# Patient Record
Sex: MALE | Race: BLACK OR AFRICAN AMERICAN | NOT HISPANIC OR LATINO | Employment: UNEMPLOYED | URBAN - METROPOLITAN AREA
[De-identification: names, ages, dates, MRNs, and addresses within clinical notes are randomized per-mention and may not be internally consistent; named-entity substitution may affect disease eponyms.]

---

## 2018-11-29 ENCOUNTER — HOSPITAL ENCOUNTER (EMERGENCY)
Facility: CLINIC | Age: 23
Discharge: HOME | End: 2018-11-29
Attending: EMERGENCY MEDICINE | Admitting: EMERGENCY MEDICINE
Payer: COMMERCIAL

## 2018-11-29 VITALS
DIASTOLIC BLOOD PRESSURE: 88 MMHG | SYSTOLIC BLOOD PRESSURE: 136 MMHG | TEMPERATURE: 98.5 F | BODY MASS INDEX: 20.73 KG/M2 | HEIGHT: 69 IN | WEIGHT: 140 LBS | HEART RATE: 86 BPM | RESPIRATION RATE: 16 BRPM | OXYGEN SATURATION: 99 %

## 2018-11-29 DIAGNOSIS — T78.40XA ALLERGIC REACTION, INITIAL ENCOUNTER: ICD-10-CM

## 2018-11-29 PROCEDURE — 99282 EMERGENCY DEPT VISIT SF MDM: CPT

## 2018-11-29 ASSESSMENT — ENCOUNTER SYMPTOMS
NAUSEA: 1
FEVER: 0
DYSURIA: 0
TROUBLE SWALLOWING: 0
ABDOMINAL PAIN: 1
VOMITING: 1
SHORTNESS OF BREATH: 0
DIARRHEA: 1

## 2018-11-29 NOTE — ED PROVIDER NOTES
History     Chief Complaint:  Abdominal Pain, Vomiting, and Rash     HPI   Davis Herrera is a 23 year old male who presents for evaluation of abdominal pain, vomiting, and a rash. This morning around 0600, the patient awoke with abdominal pain, nausea, and itchy skin. He vomited once and then went back to sleep. When he awoke later in the morning, he noticed that his skin appeared abnormally red. Since then, his symptoms have been improving over time. However, due to concern about these recent symptoms he came into the ED for evaluation. Currently in the ED, the patient reports that he is feeling well and his rash has resolved. Notably, the patient ate [employer] pizza with green peppers last night, and he does not eat green peppers often. He has not had any exposure to other new foods or new medications. He has no known food allergies. Otherwise, the patient reports that he has been having intermittent diarrhea for the last several days, but he denies any recent fever, difficulty swallowing, shortness of breath, or dysuria.     Allergies:  NKDA      Medications:    The patient is not currently taking any prescribed medications.      Past Medical History:    Kidney laceration     Past Surgical History:    History reviewed. No pertinent past surgical history.     Family History:    History reviewed. No pertinent family history.     Social History:  Tobacco use:    Current every day smoker   Alcohol use:    Positive   Marital status:    Single   The patient presents to the ED alone.      Review of Systems   Constitutional: Negative for fever.   HENT: Negative for trouble swallowing.    Respiratory: Negative for shortness of breath.    Gastrointestinal: Positive for abdominal pain, diarrhea, nausea and vomiting.   Genitourinary: Negative for dysuria.   Skin: Positive for rash.   All other systems reviewed and are negative.    Physical Exam   First Vitals:  BP: 140/60  Heart Rate: 63  Temp: 98.5  F (36.9  C)  Resp:  "18  Height: 175.3 cm (5' 9\")  Weight: 63.5 kg (140 lb)  SpO2: 99 %      Physical Exam  Constitutional: Black male supine.   HENT: No signs of trauma. Oropharynx is clear.   Eyes: EOM are normal. Pupils are equal, round, and reactive to light.   Neck: Normal range of motion. No JVD present. No cervical adenopathy.  Cardiovascular: Regular rhythm.  Exam reveals no gallop and no friction rub.    No murmur heard.  Pulmonary/Chest: Bilateral breath sounds normal. No wheezes, rhonchi or rales.  Abdominal: Soft. No tenderness. No rebound or guarding. Active bowel sounds.   Musculoskeletal: No edema. No tenderness.   Lymphadenopathy: No lymphadenopathy.   Neurological: Alert and oriented to person, place, and time. Normal strength. Coordination normal.   Skin: Skin is warm and dry. No rash noted. No erythema.     Emergency Department Course     Emergency Department Course:  Nursing notes and vitals reviewed.  1510: I performed an exam of the patient as documented above.     Findings and plan explained to the Patient. Patient discharged home with instructions regarding supportive care, medications, and reasons to return. The importance of close follow-up was reviewed.     Impression & Plan      Medical Decision Making:  Davis Herrera is a 23 year old male who presents with abdominal pain. He actually states that he woke up around six in the morning with abdominal pain and itching that got better, and when he woke up again around noon he states that his whole body was red and it was like he had giant welts. In the 2-3 hours since then, his symptoms seem to have resolved. He has no pain, no itching, no breathing or swallowing problems. He did eat a [employer] pizza last night with peppers, which is no something that he usually eats. He has no new medications or previous allergies. His exam now is unremarkable other than that his bowel sounds are increased. His abdomen is completely non-tender. His symptoms seem much more likely " related to some type of food allergy. I recommended Benadryl over the counter for recurrent symptoms and not to drive while using this and also have referred him to primary care, Dr. Bueno, if he would like to get allergy testing to find out what has caused this problem. He has had no breathing or swallowing problems to require adrenaline.     Impression:     ICD-10-CM   1. Allergic reaction, initial encounter T78.40XA       Plan:   As noted.       I, Parag Camargo, am serving as a scribe at 3:10 PM on 11/29/2018 to document services personally performed by Dr. Barroso, based on my observations and the provider's statements to me.     EMERGENCY DEPARTMENT       Levi Barroso MD  11/29/18 7575

## 2018-11-29 NOTE — ED AVS SNAPSHOT
Emergency Department    [address] [place] [address]    Phone:  [phone]    Fax:  [phone]                                       Davis Herrera   MRN: [id number]    Department:   Emergency Department   Date of Visit:  11/29/2018           After Visit Summary Signature Page     I have received my discharge instructions, and my questions have been answered. I have discussed any challenges I see with this plan with the nurse or doctor.    ..........................................................................................................................................  Patient/Patient Representative Signature      ..........................................................................................................................................  Patient Representative Print Name and Relationship to Patient    ..................................................               ................................................  Date                                   Time    ..........................................................................................................................................  Reviewed by Signature/Title    ...................................................              ..............................................  Date                                               Time          22EPIC Rev 08/18

## 2018-11-29 NOTE — ED AVS SNAPSHOT
Emergency Department    [address] [facility] [address]    Phone:  [phone]    Fax:  [phone]                                       Davis Herrera   MRN: [id number]    Department:   Emergency Department   Date of Visit:  11/29/2018           Patient Information     Date Of Birth          1995        Your diagnoses for this visit were:     Allergic reaction, initial encounter        You were seen by Levi Barroso MD.      Follow-up Information     Schedule an appointment as soon as possible for a visit with Aquiles Bueno MD.    Specialty:  Internal Medicine    Why:  recheck ed, As needed;  use otc benadryl as needed    Contact information:    [address] [id number] [phone]        Discharge References/Attachments     ALLERGIC REACTIONS, GENERAL (ENGLISH)      24 Hour Appointment Hotline       To make an appointment at any [facility], call [phone] ([phone]). If you don't have a family doctor or clinic, we will help you find one. [facility] are conveniently located to serve the needs of you and your family.             Review of your medicines      Notice     You have not been prescribed any medications.            Orders Needing Specimen Collection     None      Pending Results     No orders found from 11/27/2018 to 11/30/2018.            Pending Culture Results     No orders found from 11/27/2018 to 11/30/2018.            Pending Results Instructions     If you had any lab results that were not finalized at the time of your Discharge, you can call the ED Lab Result RN at [phone]. You will be contacted by this team for any positive Lab results or changes in treatment. The nurses are available 7 days a week from 10A to 6:30P.  You can leave a message 24 hours per day and they will return your call.        Test Results From Your Hospital Stay               Clinical Quality Measure: Blood Pressure Screening     Your  "blood pressure was checked while you were in the emergency department today. The last reading we obtained was  BP: 140/60 . Please read the guidelines below about what these numbers mean and what you should do about them.  If your systolic blood pressure (the top number) is less than 120 and your diastolic blood pressure (the bottom number) is less than 80, then your blood pressure is normal. There is nothing more that you need to do about it.  If your systolic blood pressure (the top number) is 120-139 or your diastolic blood pressure (the bottom number) is 80-89, your blood pressure may be higher than it should be. You should have your blood pressure rechecked within a year by a primary care provider.  If your systolic blood pressure (the top number) is 140 or greater or your diastolic blood pressure (the bottom number) is 90 or greater, you may have high blood pressure. High blood pressure is treatable, but if left untreated over time it can put you at risk for heart attack, stroke, or kidney failure. You should have your blood pressure rechecked by a primary care provider within the next 4 weeks.  If your provider in the emergency department today gave you specific instructions to follow-up with your doctor or provider even sooner than that, you should follow that instruction and not wait for up to 4 weeks for your follow-up visit.        Thank you for choosing [facility]       Thank you for choosing [facility] for your care. Our goal is always to provide you with excellent care. Hearing back from our patients is one way we can continue to improve our services. Please take a few minutes to complete the written survey that you may receive in the mail after you visit with us. Thank you!        [facility] Information     [facility] lets you send messages to your doctor, view your test results, renew your prescriptions, schedule appointments and more. To sign up, go to [email or web address] . Click on \"Log in\" on the left " "side of the screen, which will take you to the Welcome page. Then click on \"Sign up Now\" on the right side of the page.     You will be asked to enter the access code listed below, as well as some personal information. Please follow the directions to create your username and password.     Your access code is: [email or web address]  Expires: 2019  3:35 PM     Your access code will  in 90 days. If you need help or a new code, please call your [facility] clinic or [phone].        Care EveryWhere ID     This is your Care EveryWhere ID. This could be used by other organizations to access your [facility] medical records  [id number]        Equal Access to Services     HENRIQUE BUTTS : [place], emely huerta, bereket moses, renard guido. So [facility] [phone].    ATENCIÓN: Si habla español, tiene a snyder disposición servicios gratuitos de asistencia lingüística. Llame al [phone].    We comply with applicable federal civil rights laws and [place] laws. We do not discriminate on the basis of race, color, national origin, age, disability, sex, sexual orientation, or gender identity.            After Visit Summary       This is your record. Keep this with you and show to your community pharmacist(s) and doctor(s) at your next visit.                  "

## 2022-10-18 ENCOUNTER — HOSPITAL ENCOUNTER (EMERGENCY)
Facility: CLINIC | Age: 27
Discharge: HOME | End: 2022-10-18
Attending: EMERGENCY MEDICINE | Admitting: EMERGENCY MEDICINE
Payer: COMMERCIAL

## 2022-10-18 ENCOUNTER — APPOINTMENT (OUTPATIENT)
Dept: GENERAL RADIOLOGY | Facility: CLINIC | Age: 27
End: 2022-10-18
Attending: EMERGENCY MEDICINE
Payer: COMMERCIAL

## 2022-10-18 VITALS
DIASTOLIC BLOOD PRESSURE: 93 MMHG | RESPIRATION RATE: 18 BRPM | OXYGEN SATURATION: 98 % | BODY MASS INDEX: 21.47 KG/M2 | HEART RATE: 78 BPM | TEMPERATURE: 97.5 F | WEIGHT: 150 LBS | HEIGHT: 70 IN | SYSTOLIC BLOOD PRESSURE: 157 MMHG

## 2022-10-18 DIAGNOSIS — R07.9 CHEST PAIN, UNSPECIFIED TYPE: ICD-10-CM

## 2022-10-18 LAB
ALBUMIN SERPL-MCNC: 3.9 G/DL (ref 3.4–5)
ALP SERPL-CCNC: 77 U/L (ref 40–150)
ALT SERPL W P-5'-P-CCNC: 32 U/L (ref 0–70)
ANION GAP SERPL CALCULATED.3IONS-SCNC: 3 MMOL/L (ref 3–14)
AST SERPL W P-5'-P-CCNC: 21 U/L (ref 0–45)
ATRIAL RATE - MUSE: 87 BPM
BASOPHILS # BLD AUTO: 0 10E3/UL (ref 0–0.2)
BASOPHILS NFR BLD AUTO: 1 %
BILIRUB SERPL-MCNC: 0.4 MG/DL (ref 0.2–1.3)
BUN SERPL-MCNC: 13 MG/DL (ref 7–30)
CALCIUM SERPL-MCNC: 9.5 MG/DL (ref 8.5–10.1)
CHLORIDE BLD-SCNC: 104 MMOL/L (ref 94–109)
CO2 SERPL-SCNC: 31 MMOL/L (ref 20–32)
CREAT SERPL-MCNC: 0.75 MG/DL (ref 0.66–1.25)
CRP SERPL-MCNC: <2.9 MG/L (ref 0–8)
DIASTOLIC BLOOD PRESSURE - MUSE: NORMAL MMHG
EOSINOPHIL # BLD AUTO: 0.1 10E3/UL (ref 0–0.7)
EOSINOPHIL NFR BLD AUTO: 2 %
ERYTHROCYTE [DISTWIDTH] IN BLOOD BY AUTOMATED COUNT: 14.8 % (ref 10–15)
ERYTHROCYTE [SEDIMENTATION RATE] IN BLOOD BY WESTERGREN METHOD: 7 MM/HR (ref 0–15)
GFR SERPL CREATININE-BSD FRML MDRD: >90 ML/MIN/1.73M2
GLUCOSE BLD-MCNC: 95 MG/DL (ref 70–99)
HCT VFR BLD AUTO: 40.9 % (ref 40–53)
HGB BLD-MCNC: 13.2 G/DL (ref 13.3–17.7)
HOLD SPECIMEN: NORMAL
HOLD SPECIMEN: NORMAL
IMM GRANULOCYTES # BLD: 0 10E3/UL
IMM GRANULOCYTES NFR BLD: 0 %
INTERPRETATION ECG - MUSE: NORMAL
LYMPHOCYTES # BLD AUTO: 1.8 10E3/UL (ref 0.8–5.3)
LYMPHOCYTES NFR BLD AUTO: 37 %
MCH RBC QN AUTO: 24.4 PG (ref 26.5–33)
MCHC RBC AUTO-ENTMCNC: 32.3 G/DL (ref 31.5–36.5)
MCV RBC AUTO: 76 FL (ref 78–100)
MONOCYTES # BLD AUTO: 0.7 10E3/UL (ref 0–1.3)
MONOCYTES NFR BLD AUTO: 15 %
NEUTROPHILS # BLD AUTO: 2.2 10E3/UL (ref 1.6–8.3)
NEUTROPHILS NFR BLD AUTO: 45 %
NRBC # BLD AUTO: 0 10E3/UL
NRBC BLD AUTO-RTO: 0 /100
P AXIS - MUSE: 81 DEGREES
PLATELET # BLD AUTO: 292 10E3/UL (ref 150–450)
POTASSIUM BLD-SCNC: 3.8 MMOL/L (ref 3.4–5.3)
PR INTERVAL - MUSE: 164 MS
PROT SERPL-MCNC: 7.9 G/DL (ref 6.8–8.8)
QRS DURATION - MUSE: 88 MS
QT - MUSE: 354 MS
QTC - MUSE: 425 MS
R AXIS - MUSE: 91 DEGREES
RBC # BLD AUTO: 5.41 10E6/UL (ref 4.4–5.9)
SODIUM SERPL-SCNC: 138 MMOL/L (ref 133–144)
SYSTOLIC BLOOD PRESSURE - MUSE: NORMAL MMHG
T AXIS - MUSE: 77 DEGREES
TROPONIN I SERPL HS-MCNC: <3 NG/L
VENTRICULAR RATE- MUSE: 87 BPM
WBC # BLD AUTO: 4.8 10E3/UL (ref 4–11)

## 2022-10-18 PROCEDURE — 71046 X-RAY EXAM CHEST 2 VIEWS: CPT

## 2022-10-18 PROCEDURE — 85025 COMPLETE CBC W/AUTO DIFF WBC: CPT | Performed by: EMERGENCY MEDICINE

## 2022-10-18 PROCEDURE — 80053 COMPREHEN METABOLIC PANEL: CPT | Performed by: EMERGENCY MEDICINE

## 2022-10-18 PROCEDURE — 84484 ASSAY OF TROPONIN QUANT: CPT | Performed by: EMERGENCY MEDICINE

## 2022-10-18 PROCEDURE — 96374 THER/PROPH/DIAG INJ IV PUSH: CPT

## 2022-10-18 PROCEDURE — 86140 C-REACTIVE PROTEIN: CPT | Performed by: EMERGENCY MEDICINE

## 2022-10-18 PROCEDURE — 99285 EMERGENCY DEPT VISIT HI MDM: CPT | Mod: 25

## 2022-10-18 PROCEDURE — 82040 ASSAY OF SERUM ALBUMIN: CPT | Performed by: EMERGENCY MEDICINE

## 2022-10-18 PROCEDURE — 36415 COLL VENOUS BLD VENIPUNCTURE: CPT | Performed by: EMERGENCY MEDICINE

## 2022-10-18 PROCEDURE — 250N000011 HC RX IP 250 OP 636: Performed by: EMERGENCY MEDICINE

## 2022-10-18 PROCEDURE — 85652 RBC SED RATE AUTOMATED: CPT | Performed by: EMERGENCY MEDICINE

## 2022-10-18 PROCEDURE — 93005 ELECTROCARDIOGRAM TRACING: CPT

## 2022-10-18 RX ORDER — KETOROLAC TROMETHAMINE 15 MG/ML
15 INJECTION, SOLUTION INTRAMUSCULAR; INTRAVENOUS ONCE
Status: COMPLETED | OUTPATIENT
Start: 2022-10-18 | End: 2022-10-18

## 2022-10-18 RX ORDER — IBUPROFEN 600 MG/1
600 TABLET, FILM COATED ORAL EVERY 6 HOURS PRN
Qty: 21 TABLET | Refills: 0 | Status: SHIPPED | OUTPATIENT
Start: 2022-10-18

## 2022-10-18 RX ADMIN — KETOROLAC TROMETHAMINE 15 MG: 15 INJECTION, SOLUTION INTRAMUSCULAR; INTRAVENOUS at 11:50

## 2022-10-18 ASSESSMENT — ENCOUNTER SYMPTOMS
SHORTNESS OF BREATH: 0
HEADACHES: 0
ABDOMINAL PAIN: 0
FEVER: 0
PALPITATIONS: 0
VOMITING: 0

## 2022-10-18 NOTE — ED PROVIDER NOTES
"  History     Chief Complaint:  Chest Pain       HPI   Davis Herrera is a 27 year old male who presents with concern of left-sided chest pain.  Is been present for 2 to 3 days.  He has not taken anything for the pain.  No cough or COVID concern.  No recent fevers.  No URI symptoms.  1 month ago he had a sore throat, but that resolved on its own.  No history of diabetes, high blood pressure or high cholesterol.  Patient does smoke.  No history of albuterol use.  No nausea, vomiting or diarrhea.  No abdominal pain.  No history of DVT or pulmonary embolism.  No recent long distance travel or surgery.  No hemoptysis.    ROS:  Review of Systems   Constitutional: Negative for fever.   Respiratory: Negative for shortness of breath.    Cardiovascular: Positive for chest pain. Negative for palpitations and leg swelling.   Gastrointestinal: Negative for abdominal pain and vomiting.   Skin: Negative for rash.   Neurological: Negative for headaches.   All other systems reviewed and are negative.    Allergies:  No Known Allergies     Medications:    Does not take any daily medications    Past Medical History:    Migraines  Right kidney laceration  Ankle sprain    Past Surgical History:    None    Family History:    No family history of asthma or heart    Social History:   reports that he has been smoking. He does not have any smokeless tobacco history on file. He reports current alcohol use. He reports current drug use. Drug: Marijuana.  PCP: No Ref-Primary, Physician     Physical Exam     Patient Vitals for the past 24 hrs:   BP Temp Temp src Pulse Resp SpO2 Height Weight   10/18/22 1136 (!) 157/93 97.5  F (36.4  C) Temporal 78 18 98 % 1.778 m (5' 10\") 68 kg (150 lb)        Physical Exam  Physical Exam   General:  Sitting on bed by self.   HENT:  No obvious trauma to head  Right Ear:  External ear normal.   Left Ear:  External ear normal.   Nose:  Nose normal.   Eyes:  Conjunctivae and EOM are normal. Pupils are equal, round, " and reactive.   Neck: Normal range of motion. Neck supple. No tracheal deviation present.   CV:  Normal heart sounds. No murmur heard.  Pulm/Chest: Effort normal and breath sounds normal.   Abd: Soft. No distension. There is no tenderness. There is no rigidity, no rebound and no guarding.   M/S: Normal range of motion. no calf pain or swelling b/l  Neuro: Alert. GCS 15.  Skin: Skin is warm and dry. No rash noted. Not diaphoretic.   Psych: Normal mood and affect. Behavior is normal.     Emergency Department Course   ECG:  ECG results from 10/18/22   EKG 12 lead     Value    Systolic Blood Pressure     Diastolic Blood Pressure     Ventricular Rate 87    Atrial Rate 87    NE Interval 164    QRS Duration 88        QTc 425    P Axis 81    R AXIS 91    T Axis 77    Interpretation ECG      Sinus rhythm  Right atrial enlargement  Rightward axis  Pulmonary disease pattern  ST elevation, consider early repolarization, pericarditis, or injury  Abnormal ECG  No previous ECGs available         Imaging:  Chest XR,  PA & LAT   Preliminary Result   IMPRESSION: No acute cardiopulmonary disease.         Report per radiology    Laboratory:  Labs Ordered and Resulted from Time of ED Arrival to Time of ED Departure   CBC WITH PLATELETS AND DIFFERENTIAL - Abnormal       Result Value    WBC Count 4.8      RBC Count 5.41      Hemoglobin 13.2 (*)     Hematocrit 40.9      MCV 76 (*)     MCH 24.4 (*)     MCHC 32.3      RDW 14.8      Platelet Count 292      % Neutrophils 45      % Lymphocytes 37      % Monocytes 15      % Eosinophils 2      % Basophils 1      % Immature Granulocytes 0      NRBCs per 100 WBC 0      Absolute Neutrophils 2.2      Absolute Lymphocytes 1.8      Absolute Monocytes 0.7      Absolute Eosinophils 0.1      Absolute Basophils 0.0      Absolute Immature Granulocytes 0.0      Absolute NRBCs 0.0     COMPREHENSIVE METABOLIC PANEL - Normal    Sodium 138      Potassium 3.8      Chloride 104      Carbon Dioxide (CO2) 31       Anion Gap 3      Urea Nitrogen 13      Creatinine 0.75      Calcium 9.5      Glucose 95      Alkaline Phosphatase 77      AST 21      ALT 32      Protein Total 7.9      Albumin 3.9      Bilirubin Total 0.4      GFR Estimate >90     TROPONIN I - Normal    Troponin I High Sensitivity <3     CRP INFLAMMATION - Normal    CRP Inflammation <2.9     ERYTHROCYTE SEDIMENTATION RATE AUTO - Normal    Erythrocyte Sedimentation Rate 7        Emergency Department Course:  Reviewed:  I reviewed nursing notes, vitals, care everywhere and past medical history    Assessments:  1150 I obtained history and examined the patient as noted above.   1336 I rechecked the patient and explained findings.     Interventions:  Medications   ketorolac (TORADOL) injection 15 mg (15 mg Intravenous Given 10/18/22 1150)      Disposition:  The patient was discharged to home.     Impression & Plan    Medical Decision Making:  Davis Herrera is a very pleasant 27 year old year old patient who presents to the emergency department with concern of left-sided chest pain for the past few days.  It is more noticeable when he moves and presses on the chest.  Screening EKG shows likely J-point elevation but I did consider pericarditis.  Labs are unremarkable.  Troponin is less than 3 ruling out non-STEMI/ACS/myocarditis.  Inflammatory markers are negative so pericarditis is less likely.  I recommended continue to take ibuprofen for musculoskeletal pain or in case this does represent a mild pericarditis it would be beneficial as weell.  Discussed reasons to return.  He he is not hypoxic or short of breath and has a low Wells score and is PERC negative. The chest xray was reviewed and shows no evidence of pneumothorax, infiltrate to suggest pneumonia, widened mediastinum to suggest aortic dissection, obvious rib fracture or free air under the diaphragm to suggest perforated viscous ulcer.  Recommended close follow-up with PCP.    The treatment plan was  discussed with the patient and they expressed understanding of this plan and consented to the plan.  In addition, the patient will return to the emergency department if their symptoms persist, worsen, if new symptoms arise or if there is any concern as other pathology may be present that is not evident at this time. They also understand the importance of close follow up in the clinic and if unable to do so will return to the emergency department for a reevaluation. All questions were answered.    Diagnosis:    ICD-10-CM    1. Chest pain, unspecified type  R07.9            Discharge Medications:  New Prescriptions    IBUPROFEN (ADVIL/MOTRIN) 600 MG TABLET    Take 1 tablet (600 mg) by mouth every 6 hours as needed for moderate pain        10/18/2022   Jay Pretty DO Anderson, Robert James, DO  10/18/22 4662     rectal

## 2022-10-18 NOTE — ED TRIAGE NOTES
2-3 days of positional chest pain. Pain is located over L pec area with no radiation. Pt had heart murmur as a baby.      Triage Assessment     Row Name 10/18/22 1135       Triage Assessment (Adult)    Airway WDL WDL       Respiratory WDL    Respiratory WDL WDL       Skin Circulation/Temperature WDL    Skin Circulation/Temperature WDL WDL       Cardiac WDL    Cardiac WDL X;chest pain       Chest Pain Assessment    Chest Pain Location anterior chest, left       Peripheral/Neurovascular WDL    Peripheral Neurovascular WDL WDL       Cognitive/Neuro/Behavioral WDL    Cognitive/Neuro/Behavioral WDL WDL